# Patient Record
Sex: FEMALE | Race: WHITE | NOT HISPANIC OR LATINO | Employment: OTHER | URBAN - METROPOLITAN AREA
[De-identification: names, ages, dates, MRNs, and addresses within clinical notes are randomized per-mention and may not be internally consistent; named-entity substitution may affect disease eponyms.]

---

## 2021-10-19 ENCOUNTER — OFFICE VISIT (OUTPATIENT)
Dept: URBAN - METROPOLITAN AREA CLINIC 33 | Facility: CLINIC | Age: 76
End: 2021-10-19

## 2021-10-19 VITALS
HEART RATE: 75 BPM | SYSTOLIC BLOOD PRESSURE: 126 MMHG | WEIGHT: 130 LBS | BODY MASS INDEX: 22.2 KG/M2 | HEIGHT: 64 IN | OXYGEN SATURATION: 98 % | DIASTOLIC BLOOD PRESSURE: 82 MMHG

## 2021-10-19 PROBLEM — 428283002 HISTORY OF POLYP OF COLON (SITUATION): Status: ACTIVE | Noted: 2021-10-19

## 2021-10-19 RX ORDER — CYCLOSPORINE 0.5 MG/ML
1 DROP INTO AFFECTED EYE EMULSION OPHTHALMIC TWICE A DAY
Status: ACTIVE | COMMUNITY

## 2021-10-19 RX ORDER — CHOLECALCIFEROL TAB 50 MCG (2000 UNIT) 50 MCG
1 TABLET TAB ORAL ONCE A DAY
Status: ACTIVE | COMMUNITY

## 2021-10-19 RX ORDER — ASCORBIC ACID
CRYSTALS ORAL
Status: ACTIVE | COMMUNITY

## 2021-10-19 RX ORDER — ASPIRIN 81 MG/1
1 TABLET TABLET, CHEWABLE ORAL ONCE A DAY
Status: ON HOLD | COMMUNITY

## 2021-10-19 RX ORDER — SODIUM PICOSULFATE, MAGNESIUM OXIDE, AND ANHYDROUS CITRIC ACID 10; 3.5; 12 MG/160ML; G/160ML; G/160ML
160 ML LIQUID ORAL
Qty: 1 KIT | Refills: 0 | OUTPATIENT
Start: 2021-10-19

## 2021-10-19 RX ORDER — LEVOTHYROXINE SODIUM 0.09 MG/1
1 TABLET IN THE MORNING ON AN EMPTY STOMACH TABLET ORAL ONCE A DAY
Qty: 30 | Status: ACTIVE | COMMUNITY

## 2021-10-19 NOTE — HPI-MIGRATED HPI
;     Surveillance Colonoscopy : 76 year old  female who presents today for consultation for a surveillance colonoscopy. She has a personal history of colonic polyps and her last colonoscopy was completed on 07/24/2017 with Dr. Bowman, results are noted below. Her maternal aunt had colon cancer while in her 50's.  Currently has 1 bowel movement per day or every other day. Stools are firm without strain no blood, mucus or melena. She admits occasional episodes of heartburn, bloating.    Findings; A Marita lla 9 mm polyp(Fragments of sessile serrated adenoma) found in the ileocecal valve, Grade l internal hemorrhoids.      Last visit 08/08/2017 Patient is a 71 year old  female who presents today for consultation for a colonoscopy.  She has a personal history of colonic polyps and her last colonoscopy was 12/2012 at [facility] by Dr. Claros.  Patient admits a family history of colon cancer with maternal aunt.  Patient currently admits 1-3 bowel movements a day.  Stools are mostly solid sometimes hard if a little constipated. Patient denies any rectal bleeding/blood or mucus in stools, diarrhea.;

## 2021-11-08 ENCOUNTER — OFFICE VISIT (OUTPATIENT)
Dept: URBAN - METROPOLITAN AREA MEDICAL CENTER 27 | Facility: MEDICAL CENTER | Age: 76
End: 2021-11-08

## 2021-11-18 ENCOUNTER — DASHBOARD ENCOUNTERS (OUTPATIENT)
Age: 76
End: 2021-11-18

## 2021-11-22 ENCOUNTER — TELEPHONE ENCOUNTER (OUTPATIENT)
Dept: URBAN - METROPOLITAN AREA CLINIC 35 | Facility: CLINIC | Age: 76
End: 2021-11-22

## 2021-11-23 ENCOUNTER — OFFICE VISIT (OUTPATIENT)
Dept: URBAN - METROPOLITAN AREA CLINIC 33 | Facility: CLINIC | Age: 76
End: 2021-11-23

## 2021-11-23 NOTE — HPI-MIGRATED HPI
;     Surveillance Colonoscopy : Patient presents today for follow up to her colonoscopy.  Her colonoscopy was completed on 11/08/2021 with Dr Bowman results noted below.  She admits/denies any complications after her procedure. Currently has __ bowel movements per day.  Stools are __with/without any melena, blood or mucus.          last visit 10/19/2021 76 year old  female who presents today for consultation for a surveillance colonoscopy. She has a personal history of colonic polyps and her last colonoscopy was completed on 07/24/2017 with Dr. Bowman, results are noted below. Her maternal aunt had colon cancer while in her 50's.  Currently has 1 bowel movement per day or every other day. Stools are firm without strain no blood, mucus or melena. She admits occasional episodes of heartburn, bloating.    Findings; A Marita lla 9 mm polyp(Fragments of sessile serrated adenoma) found in the ileocecal valve, Grade l internal hemorrhoids.      Last visit 08/08/2017 Patient is a 71 year old  female who presents today for consultation for a colonoscopy.  She has a personal history of colonic polyps and her last colonoscopy was 12/2012 [facility] by Dr. Claros.  Patient admits a family history of colon cancer with maternal aunt.  Patient currently admits 1-3 bowel movements a day.  Stools are mostly solid sometimes hard if a little constipated. Patient denies any rectal bleeding/blood or mucus in stools, diarrhea.;